# Patient Record
Sex: MALE | Race: WHITE | NOT HISPANIC OR LATINO
[De-identification: names, ages, dates, MRNs, and addresses within clinical notes are randomized per-mention and may not be internally consistent; named-entity substitution may affect disease eponyms.]

---

## 2017-10-24 ENCOUNTER — APPOINTMENT (OUTPATIENT)
Dept: FAMILY MEDICINE | Facility: CLINIC | Age: 27
End: 2017-10-24
Payer: MEDICAID

## 2017-10-24 VITALS
DIASTOLIC BLOOD PRESSURE: 82 MMHG | TEMPERATURE: 98.3 F | HEIGHT: 72 IN | SYSTOLIC BLOOD PRESSURE: 130 MMHG | WEIGHT: 188.5 LBS | BODY MASS INDEX: 25.53 KG/M2

## 2017-10-24 DIAGNOSIS — B80 ENTEROBIASIS: ICD-10-CM

## 2017-10-24 DIAGNOSIS — L29.0 PRURITUS ANI: ICD-10-CM

## 2017-10-24 PROCEDURE — 99213 OFFICE O/P EST LOW 20 MIN: CPT

## 2017-11-02 ENCOUNTER — APPOINTMENT (OUTPATIENT)
Dept: FAMILY MEDICINE | Facility: CLINIC | Age: 27
End: 2017-11-02
Payer: MEDICAID

## 2017-11-02 VITALS
SYSTOLIC BLOOD PRESSURE: 130 MMHG | HEART RATE: 100 BPM | WEIGHT: 180 LBS | OXYGEN SATURATION: 98 % | HEIGHT: 72 IN | DIASTOLIC BLOOD PRESSURE: 90 MMHG | BODY MASS INDEX: 24.38 KG/M2

## 2017-11-02 PROCEDURE — 99395 PREV VISIT EST AGE 18-39: CPT | Mod: 25

## 2017-11-02 PROCEDURE — 36415 COLL VENOUS BLD VENIPUNCTURE: CPT

## 2017-11-06 LAB
ALBUMIN SERPL ELPH-MCNC: 4.8 G/DL
ALP BLD-CCNC: 71 U/L
ALT SERPL-CCNC: 39 U/L
ANION GAP SERPL CALC-SCNC: 14 MMOL/L
AST SERPL-CCNC: 24 U/L
BASOPHILS # BLD AUTO: 0.01 K/UL
BASOPHILS NFR BLD AUTO: 0.2 %
BILIRUB SERPL-MCNC: 1 MG/DL
BUN SERPL-MCNC: 11 MG/DL
C TRACH RRNA SPEC QL NAA+PROBE: NOT DETECTED
CALCIUM SERPL-MCNC: 9.7 MG/DL
CHLORIDE SERPL-SCNC: 101 MMOL/L
CHOLEST SERPL-MCNC: 230 MG/DL
CHOLEST/HDLC SERPL: 6.4 RATIO
CO2 SERPL-SCNC: 28 MMOL/L
CREAT SERPL-MCNC: 0.93 MG/DL
EOSINOPHIL # BLD AUTO: 0.06 K/UL
EOSINOPHIL NFR BLD AUTO: 1 %
GLUCOSE SERPL-MCNC: 84 MG/DL
HBA1C MFR BLD HPLC: 5.2 %
HCT VFR BLD CALC: 53.3 %
HDLC SERPL-MCNC: 36 MG/DL
HERPES SIMPLEX 1 AND 2 ABS IGM: <0.91 RATIO
HGB BLD-MCNC: 17.4 G/DL
HIV1+2 AB SPEC QL IA.RAPID: NONREACTIVE
HSV 1+2 IGG SER IA-IMP: NEGATIVE
HSV 1+2 IGG SER IA-IMP: POSITIVE
HSV1 IGG SER QL: <0.01 INDEX
HSV2 IGG SER QL: 1.11 INDEX
IMM GRANULOCYTES NFR BLD AUTO: 0.2 %
LDLC SERPL CALC-MCNC: 165 MG/DL
LYMPHOCYTES # BLD AUTO: 1.88 K/UL
LYMPHOCYTES NFR BLD AUTO: 32 %
MAN DIFF?: NORMAL
MCHC RBC-ENTMCNC: 29.8 PG
MCHC RBC-ENTMCNC: 32.6 GM/DL
MCV RBC AUTO: 91.3 FL
MONOCYTES # BLD AUTO: 0.24 K/UL
MONOCYTES NFR BLD AUTO: 4.1 %
N GONORRHOEA RRNA SPEC QL NAA+PROBE: NOT DETECTED
NEUTROPHILS # BLD AUTO: 3.68 K/UL
NEUTROPHILS NFR BLD AUTO: 62.5 %
PLATELET # BLD AUTO: 264 K/UL
POTASSIUM SERPL-SCNC: 4.7 MMOL/L
PROT SERPL-MCNC: 7.9 G/DL
RBC # BLD: 5.84 M/UL
RBC # FLD: 13.3 %
SODIUM SERPL-SCNC: 143 MMOL/L
SOURCE AMPLIFICATION: NORMAL
T PALLIDUM AB SER QL IA: NEGATIVE
T4 SERPL-MCNC: 8.4 UG/DL
TRIGL SERPL-MCNC: 144 MG/DL
TSH SERPL-ACNC: 1.46 UIU/ML
URATE SERPL-MCNC: 6.6 MG/DL
WBC # FLD AUTO: 5.88 K/UL

## 2018-08-27 ENCOUNTER — TRANSCRIPTION ENCOUNTER (OUTPATIENT)
Age: 28
End: 2018-08-27

## 2018-11-12 ENCOUNTER — APPOINTMENT (OUTPATIENT)
Dept: FAMILY MEDICINE | Facility: CLINIC | Age: 28
End: 2018-11-12
Payer: COMMERCIAL

## 2018-11-12 VITALS
HEART RATE: 85 BPM | DIASTOLIC BLOOD PRESSURE: 92 MMHG | WEIGHT: 193.38 LBS | OXYGEN SATURATION: 98 % | SYSTOLIC BLOOD PRESSURE: 140 MMHG | HEIGHT: 72 IN | BODY MASS INDEX: 26.19 KG/M2

## 2018-11-12 VITALS — SYSTOLIC BLOOD PRESSURE: 144 MMHG | DIASTOLIC BLOOD PRESSURE: 94 MMHG

## 2018-11-12 DIAGNOSIS — R03.0 ELEVATED BLOOD-PRESSURE READING, W/OUT DIAGNOSIS OF HYPERTENSION: ICD-10-CM

## 2018-11-12 PROCEDURE — 99395 PREV VISIT EST AGE 18-39: CPT

## 2018-11-12 RX ORDER — HYDROCORTISONE 10 MG/G
1 CREAM TOPICAL
Qty: 1 | Refills: 2 | Status: DISCONTINUED | COMMUNITY
Start: 2017-10-24 | End: 2018-11-12

## 2018-11-12 RX ORDER — ALBENDAZOLE 200 MG/1
200 TABLET, FILM COATED ORAL TWICE DAILY
Qty: 2 | Refills: 1 | Status: DISCONTINUED | COMMUNITY
Start: 2017-10-24 | End: 2018-11-12

## 2018-11-12 RX ORDER — KETOCONAZOLE 20.5 MG/ML
2 SHAMPOO, SUSPENSION TOPICAL
Qty: 120 | Refills: 0 | Status: DISCONTINUED | COMMUNITY
Start: 2016-10-14 | End: 2018-11-12

## 2018-11-12 NOTE — HISTORY OF PRESENT ILLNESS
[de-identified] : Pt in office for CPE. Pt has been feeling well. Pt seeing allergist to do patch test for chronic dermatitis on abdomen and back for approx 10 yrs. Rash improves with topical steroids. 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 Denies CP, palpitations, dyspnea, n/v.\par Nonsmoker\par ETOH use social rarely\par Drug use denies\par Exercises regularly walks\par Diet balanced\par Works at [employer]\par Single

## 2018-11-12 NOTE — ASSESSMENT
[FreeTextEntry1] : Elevated BP w/o hx of htn - Advised lifestyle modifications, f/u in 3 months for BP recheck. Start medication if still elevated at that time\par dermatitis - cont cortisone prn, f/u with allergist\par Healthy diet and regular exercise regimen discussed w/ pt.\par Screening labs ordered, std panel requested\par flu vaccine and tdap declined\par Any questions call office\par

## 2018-11-19 DIAGNOSIS — R74.8 ABNORMAL LEVELS OF OTHER SERUM ENZYMES: ICD-10-CM

## 2018-11-19 LAB
ALBUMIN SERPL ELPH-MCNC: 4.5 G/DL
ALP BLD-CCNC: 76 U/L
ALT SERPL-CCNC: 110 U/L
ANION GAP SERPL CALC-SCNC: 13 MMOL/L
APPEARANCE: CLEAR
AST SERPL-CCNC: 51 U/L
BACTERIA: NEGATIVE
BASOPHILS # BLD AUTO: 0.01 K/UL
BASOPHILS NFR BLD AUTO: 0.2 %
BILIRUB SERPL-MCNC: 0.5 MG/DL
BILIRUBIN URINE: NEGATIVE
BLOOD URINE: NEGATIVE
BUN SERPL-MCNC: 12 MG/DL
C TRACH RRNA SPEC QL NAA+PROBE: NOT DETECTED
CALCIUM SERPL-MCNC: 9.6 MG/DL
CHLORIDE SERPL-SCNC: 103 MMOL/L
CHOLEST SERPL-MCNC: 201 MG/DL
CHOLEST/HDLC SERPL: 5.2 RATIO
CO2 SERPL-SCNC: 27 MMOL/L
COLOR: YELLOW
CREAT SERPL-MCNC: 0.98 MG/DL
EOSINOPHIL # BLD AUTO: 0.08 K/UL
EOSINOPHIL NFR BLD AUTO: 1.4 %
GLUCOSE QUALITATIVE U: NEGATIVE MG/DL
GLUCOSE SERPL-MCNC: 85 MG/DL
HCT VFR BLD CALC: 48.8 %
HDLC SERPL-MCNC: 39 MG/DL
HGB BLD-MCNC: 17.1 G/DL
HIV1+2 AB SPEC QL IA.RAPID: NONREACTIVE
HSV 1+2 IGG SER IA-IMP: ABNORMAL
HSV 1+2 IGG SER IA-IMP: NEGATIVE
HSV1 IGG SER QL: 0.24 INDEX
HSV2 IGG SER QL: 1.02 INDEX
IMM GRANULOCYTES NFR BLD AUTO: 0.3 %
KETONES URINE: NEGATIVE
LDLC SERPL CALC-MCNC: 132 MG/DL
LEUKOCYTE ESTERASE URINE: NEGATIVE
LYMPHOCYTES # BLD AUTO: 1.78 K/UL
LYMPHOCYTES NFR BLD AUTO: 30.3 %
MAN DIFF?: NORMAL
MCHC RBC-ENTMCNC: 30.4 PG
MCHC RBC-ENTMCNC: 35 GM/DL
MCV RBC AUTO: 86.8 FL
MICROSCOPIC-UA: NORMAL
MONOCYTES # BLD AUTO: 0.43 K/UL
MONOCYTES NFR BLD AUTO: 7.3 %
N GONORRHOEA RRNA SPEC QL NAA+PROBE: NOT DETECTED
NEUTROPHILS # BLD AUTO: 3.56 K/UL
NEUTROPHILS NFR BLD AUTO: 60.5 %
NITRITE URINE: NEGATIVE
PH URINE: 8
PLATELET # BLD AUTO: 258 K/UL
POTASSIUM SERPL-SCNC: 4.9 MMOL/L
PROT SERPL-MCNC: 7.2 G/DL
PROTEIN URINE: NEGATIVE MG/DL
RBC # BLD: 5.62 M/UL
RBC # FLD: 13.4 %
RED BLOOD CELLS URINE: 2 /HPF
SODIUM SERPL-SCNC: 143 MMOL/L
SOURCE AMPLIFICATION: NORMAL
SPECIFIC GRAVITY URINE: 1.02
SQUAMOUS EPITHELIAL CELLS: 0 /HPF
T PALLIDUM AB SER QL IA: NEGATIVE
T4 FREE SERPL-MCNC: 1.2 NG/DL
TRIGL SERPL-MCNC: 151 MG/DL
TSH SERPL-ACNC: 1.55 UIU/ML
UROBILINOGEN URINE: NEGATIVE MG/DL
WBC # FLD AUTO: 5.88 K/UL
WHITE BLOOD CELLS URINE: 0 /HPF

## 2019-09-09 ENCOUNTER — TRANSCRIPTION ENCOUNTER (OUTPATIENT)
Age: 29
End: 2019-09-09

## 2019-11-26 ENCOUNTER — APPOINTMENT (OUTPATIENT)
Dept: GASTROENTEROLOGY | Facility: CLINIC | Age: 29
End: 2019-11-26
Payer: COMMERCIAL

## 2019-11-26 VITALS
BODY MASS INDEX: 24.92 KG/M2 | SYSTOLIC BLOOD PRESSURE: 129 MMHG | HEART RATE: 66 BPM | DIASTOLIC BLOOD PRESSURE: 80 MMHG | HEIGHT: 72 IN | WEIGHT: 184 LBS

## 2019-11-26 DIAGNOSIS — R14.0 ABDOMINAL DISTENSION (GASEOUS): ICD-10-CM

## 2019-11-26 DIAGNOSIS — R19.8 OTHER SPECIFIED SYMPTOMS AND SIGNS INVOLVING THE DIGESTIVE SYSTEM AND ABDOMEN: ICD-10-CM

## 2019-11-26 PROCEDURE — 99203 OFFICE O/P NEW LOW 30 MIN: CPT

## 2019-12-04 PROBLEM — R19.8 ALTERED BOWEL FUNCTION: Status: ACTIVE | Noted: 2019-11-26

## 2019-12-04 PROBLEM — R14.0 BLOATING: Status: ACTIVE | Noted: 2019-11-26

## 2019-12-04 NOTE — PHYSICAL EXAM
[General Appearance - Alert] : alert [PERRL With Normal Accommodation] : pupils were equal in size, round, and reactive to light [Sclera] : the sclera and conjunctiva were normal [General Appearance - In No Acute Distress] : in no acute distress [Extraocular Movements] : extraocular movements were intact [Neck Appearance] : the appearance of the neck was normal [Oropharynx] : the oropharynx was normal [Outer Ear] : the ears and nose were normal in appearance [Jugular Venous Distention Increased] : there was no jugular-venous distention [Neck Cervical Mass (___cm)] : no neck mass was observed [Thyroid Diffuse Enlargement] : the thyroid was not enlarged [Auscultation Breath Sounds / Voice Sounds] : lungs were clear to auscultation bilaterally [Thyroid Nodule] : there were no palpable thyroid nodules [Heart Sounds] : normal S1 and S2 [Heart Sounds Gallop] : no gallops [Murmurs] : no murmurs [Heart Rate And Rhythm] : heart rate was normal and rhythm regular [Heart Sounds Pericardial Friction Rub] : no pericardial rub [Bowel Sounds] : normal bowel sounds [Abdomen Soft] : soft [Abdomen Tenderness] : non-tender [] : no hepato-splenomegaly [Abdomen Mass (___ Cm)] : no abdominal mass palpated [Abnormal Walk] : normal gait [Nail Clubbing] : no clubbing  or cyanosis of the fingernails [Musculoskeletal - Swelling] : no joint swelling seen [Motor Tone] : muscle strength and tone were normal [Oriented To Time, Place, And Person] : oriented to person, place, and time [Affect] : the affect was normal [Impaired Insight] : insight and judgment were intact

## 2019-12-04 NOTE — REVIEW OF SYSTEMS
Medication Name: Ondansetron    No prior authorization is needed, Medication Prior Authorization team will close this encounter     [As Noted in HPI] : as noted in HPI [Abdominal Pain] : no abdominal pain [Constipation] : constipation [Vomiting] : no vomiting [Skin Wound] : no skin wound [Skin Lesions] : skin lesion [Change In A Mole] : no change in a mole [Itching] : itching [de-identified] : resolved eczema [Negative] : Heme/Lymph

## 2019-12-04 NOTE — HISTORY OF PRESENT ILLNESS
[de-identified] : 28yo male for evaluation of bloating and altered bowel movements\par For several months he has noted increased bloating with mild abdominal discomfort.\par He has been having regular BMs but notes sensation of incomplete evacuation.  He placed himself on a low FODMAP diet with decreased bloating but still some issues with BM\par \par

## 2019-12-05 ENCOUNTER — LABORATORY RESULT (OUTPATIENT)
Age: 29
End: 2019-12-05

## 2019-12-16 ENCOUNTER — TRANSCRIPTION ENCOUNTER (OUTPATIENT)
Age: 29
End: 2019-12-16

## 2019-12-16 LAB
ANNOTATION COMMENT IMP: NORMAL
CELIAC DISEASE INTERPRETATION: NORMAL
CELIAC GENE PAIRS PRESENT: YES
DQ ALPHA 1: NORMAL
DQ BETA 1: NORMAL
HLA-DQ2: POSITIVE
HLA-DQ8 QL: NEGATIVE
IMMUNOGLOBULIN A (IGA): 162 MG/DL
REF LAB TEST METHOD: NORMAL

## 2019-12-19 ENCOUNTER — APPOINTMENT (OUTPATIENT)
Dept: GASTROENTEROLOGY | Facility: CLINIC | Age: 29
End: 2019-12-19

## 2019-12-24 ENCOUNTER — APPOINTMENT (OUTPATIENT)
Dept: FAMILY MEDICINE | Facility: CLINIC | Age: 29
End: 2019-12-24

## 2020-12-14 ENCOUNTER — APPOINTMENT (OUTPATIENT)
Dept: FAMILY MEDICINE | Facility: CLINIC | Age: 30
End: 2020-12-14
Payer: COMMERCIAL

## 2020-12-14 VITALS
OXYGEN SATURATION: 98 % | SYSTOLIC BLOOD PRESSURE: 140 MMHG | HEART RATE: 90 BPM | RESPIRATION RATE: 16 BRPM | TEMPERATURE: 96.9 F | HEIGHT: 72 IN | DIASTOLIC BLOOD PRESSURE: 84 MMHG | WEIGHT: 194 LBS | BODY MASS INDEX: 26.28 KG/M2

## 2020-12-14 DIAGNOSIS — Z11.3 ENCOUNTER FOR SCREENING FOR INFECTIONS WITH A PREDOMINANTLY SEXUAL MODE OF TRANSMISSION: ICD-10-CM

## 2020-12-14 PROCEDURE — 99072 ADDL SUPL MATRL&STAF TM PHE: CPT

## 2020-12-14 PROCEDURE — 99395 PREV VISIT EST AGE 18-39: CPT

## 2020-12-15 LAB
ALBUMIN SERPL ELPH-MCNC: 4.6 G/DL
ALP BLD-CCNC: 77 U/L
ALT SERPL-CCNC: 58 U/L
ANION GAP SERPL CALC-SCNC: 11 MMOL/L
AST SERPL-CCNC: 26 U/L
BASOPHILS # BLD AUTO: 0.02 K/UL
BASOPHILS NFR BLD AUTO: 0.2 %
BILIRUB SERPL-MCNC: 0.8 MG/DL
BUN SERPL-MCNC: 12 MG/DL
C TRACH RRNA SPEC QL NAA+PROBE: NOT DETECTED
CALCIUM SERPL-MCNC: 9.5 MG/DL
CHLORIDE SERPL-SCNC: 99 MMOL/L
CHOLEST SERPL-MCNC: 219 MG/DL
CO2 SERPL-SCNC: 27 MMOL/L
CREAT SERPL-MCNC: 1.11 MG/DL
EOSINOPHIL # BLD AUTO: 0.1 K/UL
EOSINOPHIL NFR BLD AUTO: 1 %
ESTIMATED AVERAGE GLUCOSE: 103 MG/DL
GLUCOSE SERPL-MCNC: 83 MG/DL
HBA1C MFR BLD HPLC: 5.2 %
HCT VFR BLD CALC: 50.5 %
HDLC SERPL-MCNC: 35 MG/DL
HGB BLD-MCNC: 16.8 G/DL
HIV1+2 AB SPEC QL IA.RAPID: NONREACTIVE
IMM GRANULOCYTES NFR BLD AUTO: 0.5 %
LDLC SERPL CALC-MCNC: 148 MG/DL
LYMPHOCYTES # BLD AUTO: 2.34 K/UL
LYMPHOCYTES NFR BLD AUTO: 23 %
MAN DIFF?: NORMAL
MCHC RBC-ENTMCNC: 29.8 PG
MCHC RBC-ENTMCNC: 33.3 GM/DL
MCV RBC AUTO: 89.7 FL
MONOCYTES # BLD AUTO: 0.86 K/UL
MONOCYTES NFR BLD AUTO: 8.4 %
N GONORRHOEA RRNA SPEC QL NAA+PROBE: NOT DETECTED
NEUTROPHILS # BLD AUTO: 6.81 K/UL
NEUTROPHILS NFR BLD AUTO: 66.9 %
NONHDLC SERPL-MCNC: 184 MG/DL
PLATELET # BLD AUTO: 265 K/UL
POTASSIUM SERPL-SCNC: 4.1 MMOL/L
PROT SERPL-MCNC: 6.8 G/DL
RBC # BLD: 5.63 M/UL
RBC # FLD: 12.5 %
SARS-COV-2 IGG SERPL IA-ACNC: 0.09 INDEX
SARS-COV-2 IGG SERPL QL IA: NEGATIVE
SODIUM SERPL-SCNC: 136 MMOL/L
SOURCE AMPLIFICATION: NORMAL
T PALLIDUM AB SER QL IA: NEGATIVE
T4 SERPL-MCNC: 6.1 UG/DL
TRIGL SERPL-MCNC: 182 MG/DL
TSH SERPL-ACNC: 1.64 UIU/ML
URATE SERPL-MCNC: 6.4 MG/DL
WBC # FLD AUTO: 10.18 K/UL

## 2020-12-16 LAB
HSV 1+2 IGG SER IA-IMP: ABNORMAL
HSV 1+2 IGG SER IA-IMP: NEGATIVE
HSV1 IGG SER QL: 0.33 INDEX
HSV2 IGG SER QL: 0.9 INDEX

## 2020-12-21 LAB
HSV1 IGM SER QL: NORMAL TITER
HSV2 AB FLD-ACNC: NORMAL TITER

## 2021-12-28 ENCOUNTER — APPOINTMENT (OUTPATIENT)
Dept: FAMILY MEDICINE | Facility: CLINIC | Age: 31
End: 2021-12-28

## 2022-10-06 ENCOUNTER — APPOINTMENT (OUTPATIENT)
Dept: FAMILY MEDICINE | Facility: CLINIC | Age: 32
End: 2022-10-06

## 2022-12-27 ENCOUNTER — NON-APPOINTMENT (OUTPATIENT)
Age: 32
End: 2022-12-27

## 2022-12-28 ENCOUNTER — APPOINTMENT (OUTPATIENT)
Dept: FAMILY MEDICINE | Facility: CLINIC | Age: 32
End: 2022-12-28

## 2022-12-28 VITALS
DIASTOLIC BLOOD PRESSURE: 83 MMHG | BODY MASS INDEX: 27.36 KG/M2 | HEART RATE: 94 BPM | HEIGHT: 72 IN | WEIGHT: 202 LBS | TEMPERATURE: 97.4 F | SYSTOLIC BLOOD PRESSURE: 130 MMHG | OXYGEN SATURATION: 98 %

## 2022-12-28 DIAGNOSIS — Z13.29 ENCOUNTER FOR SCREENING FOR OTHER SUSPECTED ENDOCRINE DISORDER: ICD-10-CM

## 2022-12-28 DIAGNOSIS — Z13.6 ENCOUNTER FOR SCREENING FOR CARDIOVASCULAR DISORDERS: ICD-10-CM

## 2022-12-28 DIAGNOSIS — Z13.220 ENCOUNTER FOR SCREENING FOR LIPOID DISORDERS: ICD-10-CM

## 2022-12-28 PROCEDURE — 99385 PREV VISIT NEW AGE 18-39: CPT

## 2022-12-28 NOTE — HISTORY OF PRESENT ILLNESS
[de-identified] : Pt presenting for CPE\par \par Doing well overall\par Father has hx of CAD, recentl MI\par

## 2022-12-28 NOTE — HEALTH RISK ASSESSMENT
[Good] : ~his/her~  mood as  good [Never] : Never [Yes] : Yes [No] : In the past 12 months have you used drugs other than those required for medical reasons? No [No falls in past year] : Patient reported no falls in the past year [0] : 2) Feeling down, depressed, or hopeless: Not at all (0) [de-identified] : walking

## 2022-12-28 NOTE — ASSESSMENT
[FreeTextEntry1] : Pt presenting for annual physical. \par \par Reviewed diet, exercise (150 min/moderate intensity exercise weekly), lifestyle modifications. \par Discussed STD prevention and discussed screening studies\par Labs ordered per above. \par f.u in 1 year or earlier if needed \par \par \par

## 2023-01-03 LAB
25(OH)D3 SERPL-MCNC: 22.8 NG/ML
ALBUMIN SERPL ELPH-MCNC: 4.8 G/DL
ALP BLD-CCNC: 86 U/L
ALT SERPL-CCNC: 56 U/L
ANION GAP SERPL CALC-SCNC: 15 MMOL/L
APPEARANCE: CLEAR
AST SERPL-CCNC: 23 U/L
BASOPHILS # BLD AUTO: 0.01 K/UL
BASOPHILS NFR BLD AUTO: 0.1 %
BILIRUB SERPL-MCNC: 0.9 MG/DL
BILIRUBIN URINE: NEGATIVE
BLOOD URINE: NEGATIVE
BUN SERPL-MCNC: 14 MG/DL
CALCIUM SERPL-MCNC: 9.7 MG/DL
CHLORIDE SERPL-SCNC: 100 MMOL/L
CHOLEST SERPL-MCNC: 254 MG/DL
CO2 SERPL-SCNC: 26 MMOL/L
COLOR: NORMAL
CREAT SERPL-MCNC: 1.07 MG/DL
EGFR: 94 ML/MIN/1.73M2
EOSINOPHIL # BLD AUTO: 0.09 K/UL
EOSINOPHIL NFR BLD AUTO: 1.3 %
ESTIMATED AVERAGE GLUCOSE: 103 MG/DL
FOLATE SERPL-MCNC: 14.1 NG/ML
GLUCOSE QUALITATIVE U: NEGATIVE
GLUCOSE SERPL-MCNC: 72 MG/DL
HBA1C MFR BLD HPLC: 5.2 %
HCT VFR BLD CALC: 50.4 %
HDLC SERPL-MCNC: 32 MG/DL
HGB BLD-MCNC: 17.5 G/DL
IMM GRANULOCYTES NFR BLD AUTO: 0.4 %
IRON SATN MFR SERPL: 55 %
IRON SERPL-MCNC: 177 UG/DL
KETONES URINE: NEGATIVE
LDLC SERPL CALC-MCNC: 172 MG/DL
LEUKOCYTE ESTERASE URINE: NEGATIVE
LYMPHOCYTES # BLD AUTO: 2.44 K/UL
LYMPHOCYTES NFR BLD AUTO: 36.6 %
MAN DIFF?: NORMAL
MCHC RBC-ENTMCNC: 29.6 PG
MCHC RBC-ENTMCNC: 34.7 GM/DL
MCV RBC AUTO: 85.1 FL
MONOCYTES # BLD AUTO: 0.59 K/UL
MONOCYTES NFR BLD AUTO: 8.8 %
NEUTROPHILS # BLD AUTO: 3.51 K/UL
NEUTROPHILS NFR BLD AUTO: 52.8 %
NITRITE URINE: NEGATIVE
NONHDLC SERPL-MCNC: 221 MG/DL
PH URINE: 6.5
PLATELET # BLD AUTO: 265 K/UL
POTASSIUM SERPL-SCNC: 4.3 MMOL/L
PROT SERPL-MCNC: 7.2 G/DL
PROTEIN URINE: NEGATIVE
RBC # BLD: 5.92 M/UL
RBC # FLD: 12.7 %
SODIUM SERPL-SCNC: 141 MMOL/L
SPECIFIC GRAVITY URINE: 1.01
TIBC SERPL-MCNC: 324 UG/DL
TRIGL SERPL-MCNC: 246 MG/DL
TSH SERPL-ACNC: 2.21 UIU/ML
UIBC SERPL-MCNC: 147 UG/DL
UROBILINOGEN URINE: NORMAL
VIT B12 SERPL-MCNC: 640 PG/ML
WBC # FLD AUTO: 6.67 K/UL

## 2023-01-06 ENCOUNTER — APPOINTMENT (OUTPATIENT)
Dept: INTERNAL MEDICINE | Facility: CLINIC | Age: 33
End: 2023-01-06

## 2024-01-26 ENCOUNTER — APPOINTMENT (OUTPATIENT)
Dept: INTERNAL MEDICINE | Facility: CLINIC | Age: 34
End: 2024-01-26
Payer: COMMERCIAL

## 2024-01-26 VITALS
DIASTOLIC BLOOD PRESSURE: 72 MMHG | HEART RATE: 86 BPM | HEIGHT: 72 IN | SYSTOLIC BLOOD PRESSURE: 120 MMHG | BODY MASS INDEX: 26.82 KG/M2 | RESPIRATION RATE: 14 BRPM | TEMPERATURE: 98.1 F | WEIGHT: 198 LBS | OXYGEN SATURATION: 99 %

## 2024-01-26 DIAGNOSIS — Z00.00 ENCOUNTER FOR GENERAL ADULT MEDICAL EXAMINATION W/OUT ABNORMAL FINDINGS: ICD-10-CM

## 2024-01-26 DIAGNOSIS — Z86.39 PERSONAL HISTORY OF OTHER ENDOCRINE, NUTRITIONAL AND METABOLIC DISEASE: ICD-10-CM

## 2024-01-26 DIAGNOSIS — Z83.438 FAMILY HISTORY OF OTHER DISORDER OF LIPOPROTEIN METABOLISM AND OTHER LIPIDEMIA: ICD-10-CM

## 2024-01-26 PROCEDURE — 99385 PREV VISIT NEW AGE 18-39: CPT

## 2024-01-26 NOTE — HEALTH RISK ASSESSMENT
[Very Good] : ~his/her~  mood as very good [No falls in past year] : Patient reported no falls in the past year [0] : 2) Feeling down, depressed, or hopeless: Not at all (0) [[id number]] : 0

## 2024-01-29 LAB
25(OH)D3 SERPL-MCNC: 25.4 NG/ML
ALBUMIN SERPL ELPH-MCNC: 5.1 G/DL
ALP BLD-CCNC: 84 U/L
ALT SERPL-CCNC: 33 U/L
ANION GAP SERPL CALC-SCNC: 11 MMOL/L
APPEARANCE: CLEAR
AST SERPL-CCNC: 19 U/L
BACTERIA: NEGATIVE /HPF
BASOPHILS # BLD AUTO: 0.02 K/UL
BASOPHILS NFR BLD AUTO: 0.3 %
BILIRUB SERPL-MCNC: 0.9 MG/DL
BILIRUBIN URINE: NEGATIVE
BLOOD URINE: NEGATIVE
BUN SERPL-MCNC: 10 MG/DL
CALCIUM SERPL-MCNC: 9.5 MG/DL
CAST: 0 /LPF
CHLORIDE SERPL-SCNC: 103 MMOL/L
CHOLEST SERPL-MCNC: 246 MG/DL
CO2 SERPL-SCNC: 26 MMOL/L
COLOR: YELLOW
CREAT SERPL-MCNC: 0.95 MG/DL
EGFR: 108 ML/MIN/1.73M2
EOSINOPHIL # BLD AUTO: 0.09 K/UL
EOSINOPHIL NFR BLD AUTO: 1.5 %
EPITHELIAL CELLS: 0 /HPF
ESTIMATED AVERAGE GLUCOSE: 105 MG/DL
FOLATE SERPL-MCNC: 8.3 NG/ML
GLUCOSE QUALITATIVE U: NEGATIVE MG/DL
GLUCOSE SERPL-MCNC: 86 MG/DL
HBA1C MFR BLD HPLC: 5.3 %
HCT VFR BLD CALC: 48.3 %
HDLC SERPL-MCNC: 37 MG/DL
HGB BLD-MCNC: 17 G/DL
IMM GRANULOCYTES NFR BLD AUTO: 0.3 %
KETONES URINE: NEGATIVE MG/DL
LDLC SERPL CALC-MCNC: 183 MG/DL
LEUKOCYTE ESTERASE URINE: NEGATIVE
LYMPHOCYTES # BLD AUTO: 2.08 K/UL
LYMPHOCYTES NFR BLD AUTO: 35.4 %
MAN DIFF?: NORMAL
MCHC RBC-ENTMCNC: 29.9 PG
MCHC RBC-ENTMCNC: 35.2 GM/DL
MCV RBC AUTO: 84.9 FL
MICROSCOPIC-UA: NORMAL
MONOCYTES # BLD AUTO: 0.4 K/UL
MONOCYTES NFR BLD AUTO: 6.8 %
NEUTROPHILS # BLD AUTO: 3.26 K/UL
NEUTROPHILS NFR BLD AUTO: 55.7 %
NITRITE URINE: NEGATIVE
NONHDLC SERPL-MCNC: 209 MG/DL
PH URINE: 8
PLATELET # BLD AUTO: 286 K/UL
POTASSIUM SERPL-SCNC: 4.4 MMOL/L
PROT SERPL-MCNC: 7.3 G/DL
PROTEIN URINE: NEGATIVE MG/DL
RBC # BLD: 5.69 M/UL
RBC # FLD: 12.7 %
RED BLOOD CELLS URINE: 1 /HPF
SODIUM SERPL-SCNC: 140 MMOL/L
SPECIFIC GRAVITY URINE: 1.01
TRIGL SERPL-MCNC: 143 MG/DL
TSH SERPL-ACNC: 1.27 UIU/ML
UROBILINOGEN URINE: 0.2 MG/DL
VIT B12 SERPL-MCNC: 542 PG/ML
WBC # FLD AUTO: 5.87 K/UL
WHITE BLOOD CELLS URINE: 0 /HPF

## 2024-01-29 RX ORDER — ROSUVASTATIN CALCIUM 10 MG/1
10 TABLET, FILM COATED ORAL DAILY
Qty: 90 | Refills: 0 | Status: ACTIVE | COMMUNITY
Start: 2023-01-03 | End: 1900-01-01

## 2024-02-01 ENCOUNTER — TRANSCRIPTION ENCOUNTER (OUTPATIENT)
Age: 34
End: 2024-02-01

## 2024-02-16 RX ORDER — TRIAMCINOLONE ACETONIDE 1 MG/G
0.1 CREAM TOPICAL 3 TIMES DAILY
Qty: 1 | Refills: 0 | Status: ACTIVE | COMMUNITY
Start: 2020-12-14 | End: 1900-01-01

## 2024-04-11 ENCOUNTER — APPOINTMENT (OUTPATIENT)
Dept: INTERNAL MEDICINE | Facility: CLINIC | Age: 34
End: 2024-04-11
Payer: COMMERCIAL

## 2024-04-11 ENCOUNTER — NON-APPOINTMENT (OUTPATIENT)
Age: 34
End: 2024-04-11

## 2024-04-11 PROCEDURE — 90471 IMMUNIZATION ADMIN: CPT

## 2024-04-11 PROCEDURE — 90715 TDAP VACCINE 7 YRS/> IM: CPT

## 2024-04-12 ENCOUNTER — TRANSCRIPTION ENCOUNTER (OUTPATIENT)
Age: 34
End: 2024-04-12

## 2024-04-12 DIAGNOSIS — R79.89 OTHER SPECIFIED ABNORMAL FINDINGS OF BLOOD CHEMISTRY: ICD-10-CM

## 2024-04-12 DIAGNOSIS — Z13.220 ENCOUNTER FOR SCREENING FOR LIPOID DISORDERS: ICD-10-CM

## 2024-04-12 LAB
ALBUMIN SERPL ELPH-MCNC: 5 G/DL
ALP BLD-CCNC: 95 U/L
ALT SERPL-CCNC: 79 U/L
ANION GAP SERPL CALC-SCNC: 15 MMOL/L
AST SERPL-CCNC: 38 U/L
BILIRUB SERPL-MCNC: 0.9 MG/DL
BUN SERPL-MCNC: 13 MG/DL
CALCIUM SERPL-MCNC: 10 MG/DL
CHLORIDE SERPL-SCNC: 100 MMOL/L
CHOLEST SERPL-MCNC: 219 MG/DL
CO2 SERPL-SCNC: 24 MMOL/L
CREAT SERPL-MCNC: 0.9 MG/DL
CREAT SPEC-SCNC: 53 MG/DL
CREAT/PROT UR: 0.1 RATIO
EGFR: 114 ML/MIN/1.73M2
GLUCOSE SERPL-MCNC: 88 MG/DL
HDLC SERPL-MCNC: 39 MG/DL
LDLC SERPL CALC-MCNC: 143 MG/DL
NONHDLC SERPL-MCNC: 180 MG/DL
POTASSIUM SERPL-SCNC: 4.6 MMOL/L
PROT SERPL-MCNC: 7.5 G/DL
PROT UR-MCNC: 5 MG/DL
SODIUM SERPL-SCNC: 139 MMOL/L
TRIGL SERPL-MCNC: 202 MG/DL

## 2024-04-14 LAB
MEV IGG FLD QL IA: 10 AU/ML
MEV IGG+IGM SER-IMP: NEGATIVE
MUV AB SER-ACNC: NEGATIVE
MUV IGG SER QL IA: <5 AU/ML
RUBV IGG FLD-ACNC: <0.1 INDEX
RUBV IGG SER-IMP: NEGATIVE
VZV AB TITR SER: POSITIVE
VZV IGG SER IF-ACNC: >4000 INDEX

## 2024-04-26 ENCOUNTER — APPOINTMENT (OUTPATIENT)
Dept: ULTRASOUND IMAGING | Facility: CLINIC | Age: 34
End: 2024-04-26

## 2024-05-30 ENCOUNTER — APPOINTMENT (OUTPATIENT)
Dept: DERMATOLOGY | Facility: CLINIC | Age: 34
End: 2024-05-30
Payer: COMMERCIAL

## 2024-05-30 DIAGNOSIS — L73.9 FOLLICULAR DISORDER, UNSPECIFIED: ICD-10-CM

## 2024-05-30 DIAGNOSIS — Z12.83 ENCOUNTER FOR SCREENING FOR MALIGNANT NEOPLASM OF SKIN: ICD-10-CM

## 2024-05-30 DIAGNOSIS — D22.9 MELANOCYTIC NEVI, UNSPECIFIED: ICD-10-CM

## 2024-05-30 DIAGNOSIS — L30.9 DERMATITIS, UNSPECIFIED: ICD-10-CM

## 2024-05-30 PROCEDURE — 99204 OFFICE O/P NEW MOD 45 MIN: CPT

## 2024-05-30 NOTE — HISTORY OF PRESENT ILLNESS
[FreeTextEntry1] : rash [de-identified] : 34M with no pfhx of skin cancer here for  1) mole check 2) bumps on the arms and back. Uses ?TAC cream and/or clindamycin lotion every 2 days with improvement

## 2024-05-30 NOTE — PHYSICAL EXAM
[Alert] : alert [Oriented x 3] : ~L oriented x 3 [Well Nourished] : well nourished [Conjunctiva Non-injected] : conjunctiva non-injected [No Visual Lymphadenopathy] : no visual  lymphadenopathy [No Clubbing] : no clubbing [No Edema] : no edema [No Bromhidrosis] : no bromhidrosis [No Chromhidrosis] : no chromhidrosis [FreeTextEntry3] : The patient is well-appearing, in no acute distress, alert and oriented x 3. Mood and affect are normal. A complete cutaneous examination of the scalp, face, neck, chest, abdomen, back, bilateral arms, bilateral legs, buttocks, digits, nails, eyelids, conjunctiva and lips reveals the following significant findings: -Tan to dark brown macules on the trunk and extremities with no concerning dermoscopic features  -Arms and back with folliculocentric erythematous papules

## 2024-05-31 ENCOUNTER — TRANSCRIPTION ENCOUNTER (OUTPATIENT)
Age: 34
End: 2024-05-31

## 2024-07-15 ENCOUNTER — TRANSCRIPTION ENCOUNTER (OUTPATIENT)
Age: 34
End: 2024-07-15

## 2024-09-20 ENCOUNTER — RX RENEWAL (OUTPATIENT)
Age: 34
End: 2024-09-20

## 2024-09-26 ENCOUNTER — APPOINTMENT (OUTPATIENT)
Dept: DERMATOLOGY | Facility: CLINIC | Age: 34
End: 2024-09-26

## 2024-12-18 ENCOUNTER — RX RENEWAL (OUTPATIENT)
Age: 34
End: 2024-12-18

## 2025-03-10 ENCOUNTER — RX RENEWAL (OUTPATIENT)
Age: 35
End: 2025-03-10

## 2025-05-27 ENCOUNTER — TRANSCRIPTION ENCOUNTER (OUTPATIENT)
Age: 35
End: 2025-05-27

## 2025-06-09 ENCOUNTER — RX RENEWAL (OUTPATIENT)
Age: 35
End: 2025-06-09

## 2025-09-10 ENCOUNTER — RX RENEWAL (OUTPATIENT)
Age: 35
End: 2025-09-10